# Patient Record
(demographics unavailable — no encounter records)

---

## 2024-12-07 NOTE — HISTORY OF PRESENT ILLNESS
[de-identified] : FEVER,  COUGH,  SORE THROAT, LOSS OF APPETITE,  VOMITTING, AND  BODY ACHES. MOM STATES THAT THE PATIENT HAD RHINOVIRUS OVER A WEEK AGO AND HAS NOT IMPROVED.  [FreeTextEntry6] : 4 yr old F presenting for 11 days of symptoms. Started with cough, rhinorrhea, tiredness. Tested positive for +Rhino/enterovirus last week at urgent care. Went back yesterday due to worsening cough, but did not fully examine her. Now having fevers over last 2 days, tmax unknown as mom is treating due to hx of febrile seizures. She was awake all last night w/ cough. Decreased appetite, still more tired than normal. Using Albuterol does not seem to help.

## 2024-12-07 NOTE — DISCUSSION/SUMMARY
[FreeTextEntry1] : 4 year old F with symptoms likely of worsening URI, persistent cough. PE and vitals are wnl. Rapid Strep negative.   Recommend supportive care including antipyretics, fluids, and humidifier/warm bath, then nasal saline followed by nasal suction. Education provided for signs of respiratory distress and dehydration. Return if symptoms worsen or persist. F/u throat cx Azithromycin x5 days C/w Albuterol prn

## 2024-12-07 NOTE — HISTORY OF PRESENT ILLNESS
[de-identified] : FEVER,  COUGH,  SORE THROAT, LOSS OF APPETITE,  VOMITTING, AND  BODY ACHES. MOM STATES THAT THE PATIENT HAD RHINOVIRUS OVER A WEEK AGO AND HAS NOT IMPROVED.  [FreeTextEntry6] : 4 yr old F presenting for 11 days of symptoms. Started with cough, rhinorrhea, tiredness. Tested positive for +Rhino/enterovirus last week at urgent care. Went back yesterday due to worsening cough, but did not fully examine her. Now having fevers over last 2 days, tmax unknown as mom is treating due to hx of febrile seizures. She was awake all last night w/ cough. Decreased appetite, still more tired than normal. Using Albuterol does not seem to help.

## 2024-12-07 NOTE — PHYSICAL EXAM
[Clear] : right tympanic membrane clear [Erythematous Oropharynx] : erythematous oropharynx [Enlarged Tonsils] : enlarged tonsils [NL] : warm, clear [Tired appearing] : not tired appearing [Lethargic] : not lethargic [Erythema] : no erythema [Bulging] : not bulging [Clear Effusion] : no clear effusion [Vesicles] : no vesicles [Exudate] : no exudate [Wheezing] : no wheezing [Crackles] : no crackles [Transmitted Upper Airway Sounds] : no transmitted upper airway sounds [Tachypnea] : no tachypnea

## 2025-05-27 NOTE — DISCUSSION/SUMMARY
[FreeTextEntry1] : 3 y/o F w/ presentation consistent with acute URI. Erythematous oropharynx noted on exam, will evaluate for strep throat.  Plan: 1. Rapid strep (negative); throat culture 2. Supportive care with Tylenol/Motrin PRN, increased fluids, keeping head elevated and rest  3. Monitor and return with any new or worsening symptoms.

## 2025-05-27 NOTE — HISTORY OF PRESENT ILLNESS
[de-identified] : SWOLLEN THROAT, COUGH, SNEEZING, RUNNY NOSE [FreeTextEntry6] : 5 y/o F present complaining of cough, congestion and sore throat x4 days. Endorses temp of 100.3 F yesterday, otherwise afebrile. Denies any SOB.